# Patient Record
Sex: FEMALE | Race: WHITE | NOT HISPANIC OR LATINO | Employment: UNEMPLOYED | URBAN - METROPOLITAN AREA
[De-identification: names, ages, dates, MRNs, and addresses within clinical notes are randomized per-mention and may not be internally consistent; named-entity substitution may affect disease eponyms.]

---

## 2020-12-07 ENCOUNTER — OFFICE VISIT (OUTPATIENT)
Dept: ORTHOPEDIC SURGERY | Facility: CLINIC | Age: 40
End: 2020-12-07

## 2020-12-07 VITALS — BODY MASS INDEX: 50.02 KG/M2 | HEIGHT: 64 IN | WEIGHT: 293 LBS | HEART RATE: 83 BPM | OXYGEN SATURATION: 99 %

## 2020-12-07 DIAGNOSIS — M17.11 OSTEOARTHRITIS OF RIGHT KNEE, UNSPECIFIED OSTEOARTHRITIS TYPE: Primary | ICD-10-CM

## 2020-12-07 PROCEDURE — 99203 OFFICE O/P NEW LOW 30 MIN: CPT | Performed by: ORTHOPAEDIC SURGERY

## 2020-12-07 PROCEDURE — 20610 DRAIN/INJ JOINT/BURSA W/O US: CPT | Performed by: ORTHOPAEDIC SURGERY

## 2020-12-07 RX ORDER — GABAPENTIN 300 MG/1
2 CAPSULE ORAL 4 TIMES DAILY
COMMUNITY

## 2020-12-07 RX ORDER — NYSTATIN 10B UNIT
POWDER (EA) MISCELLANEOUS
COMMUNITY
End: 2021-03-08

## 2020-12-07 RX ORDER — ARIPIPRAZOLE 5 MG/1
TABLET ORAL
COMMUNITY

## 2020-12-07 RX ORDER — FAMOTIDINE 20 MG/1
20 TABLET, FILM COATED ORAL 2 TIMES DAILY
COMMUNITY

## 2020-12-07 RX ORDER — MONTELUKAST SODIUM 10 MG/1
TABLET ORAL
COMMUNITY

## 2020-12-07 RX ORDER — BUPROPION HYDROCHLORIDE 200 MG/1
TABLET, EXTENDED RELEASE ORAL EVERY 12 HOURS SCHEDULED
COMMUNITY

## 2020-12-07 RX ORDER — TRIAMCINOLONE ACETONIDE 40 MG/ML
40 INJECTION, SUSPENSION INTRA-ARTICULAR; INTRAMUSCULAR
Status: COMPLETED | OUTPATIENT
Start: 2020-12-07 | End: 2020-12-07

## 2020-12-07 RX ORDER — BACLOFEN 10 MG/1
TABLET ORAL EVERY 8 HOURS SCHEDULED
COMMUNITY

## 2020-12-07 RX ORDER — DEXTROAMPHETAMINE SACCHARATE, AMPHETAMINE ASPARTATE MONOHYDRATE, DEXTROAMPHETAMINE SULFATE AND AMPHETAMINE SULFATE 6.25; 6.25; 6.25; 6.25 MG/1; MG/1; MG/1; MG/1
CAPSULE, EXTENDED RELEASE ORAL
COMMUNITY

## 2020-12-07 RX ORDER — ALBUTEROL SULFATE 90 UG/1
AEROSOL, METERED RESPIRATORY (INHALATION)
COMMUNITY

## 2020-12-07 RX ORDER — ROPIVACAINE HYDROCHLORIDE 5 MG/ML
4 INJECTION, SOLUTION EPIDURAL; INFILTRATION; PERINEURAL
Status: COMPLETED | OUTPATIENT
Start: 2020-12-07 | End: 2020-12-07

## 2020-12-07 RX ORDER — NORETHINDRONE ACETATE AND ETHINYL ESTRADIOL AND FERROUS FUMARATE 5-7-9-7
KIT ORAL
COMMUNITY

## 2020-12-07 RX ORDER — DULOXETIN HYDROCHLORIDE 60 MG/1
CAPSULE, DELAYED RELEASE ORAL
COMMUNITY

## 2020-12-07 RX ORDER — ALPRAZOLAM 1 MG/1
TABLET ORAL EVERY 12 HOURS SCHEDULED
COMMUNITY

## 2020-12-07 RX ORDER — SUMATRIPTAN 100 MG/1
100 TABLET, FILM COATED ORAL
COMMUNITY

## 2020-12-07 RX ORDER — PANTOPRAZOLE SODIUM 40 MG/1
TABLET, DELAYED RELEASE ORAL
COMMUNITY

## 2020-12-07 RX ORDER — AMITRIPTYLINE HYDROCHLORIDE 25 MG/1
TABLET, FILM COATED ORAL
COMMUNITY

## 2020-12-07 RX ORDER — HYDROCODONE BITARTRATE AND ACETAMINOPHEN 7.5; 325 MG/1; MG/1
TABLET ORAL
COMMUNITY

## 2020-12-07 RX ORDER — LIDOCAINE 4 G/G
PATCH TOPICAL
COMMUNITY
End: 2021-03-08

## 2020-12-07 RX ADMIN — ROPIVACAINE HYDROCHLORIDE 4 ML: 5 INJECTION, SOLUTION EPIDURAL; INFILTRATION; PERINEURAL at 17:17

## 2020-12-07 RX ADMIN — TRIAMCINOLONE ACETONIDE 40 MG: 40 INJECTION, SUSPENSION INTRA-ARTICULAR; INTRAMUSCULAR at 17:17

## 2020-12-07 NOTE — PROGRESS NOTES
Procedure   Large Joint Arthrocentesis: R knee  Date/Time: 12/7/2020 5:17 PM  Consent given by: patient  Site marked: site marked  Timeout: Immediately prior to procedure a time out was called to verify the correct patient, procedure, equipment, support staff and site/side marked as required   Supporting Documentation  Indications: pain   Procedure Details  Location: knee - R knee  Preparation: Patient was prepped and draped in the usual sterile fashion  Needle size: 22 G  Approach: anterolateral  Medications administered: 4 mL ropivacaine 0.5 %; 40 mg triamcinolone acetonide 40 MG/ML  Patient tolerance: patient tolerated the procedure well with no immediate complications

## 2020-12-07 NOTE — PROGRESS NOTES
[facility]    Subjective     Chief Complaint   Patient presents with   • Right Knee - Pain        HPI    Yue Reyes is a 40 y.o. female who presents with right knee pain.  Onset: direct blow. The issue has been ongoing for 6 month(s). Pain is a 5/10 on the pain scale. Pain is described as aching, throbbing and stabbing. Associated symptoms include pain, swelling, popping, stiffness and giving way/buckling. The pain is worse with walking and standing; resting, ice, heat and pain medication and/or NSAID improve the pain. Previous treatments have included: steroid injection (last injection 08/2020).  Patient has a history of MS, and is wheelchair-bound.    I have reviewed the following portions of the patient's history and agree with: History of Present Illness and Review of Systems    There is no problem list on file for this patient.    Past Medical History:   Diagnosis Date   • Anemia    • Asthma    • Back problem    • Diabetes (CMS/[facility])    • MS (multiple sclerosis) (CMS/[facility])       Past Surgical History:   Procedure Laterality Date   • CHOLECYSTECTOMY     • EYE SURGERY Left     strabismus repair   • GASTROSTOMY      vertical sleeve   • INCISION AND DRAINAGE ABSCESS      abdominal wall      Family History   Problem Relation Age of Onset   • Cancer Mother    • Gout Father    • Diabetes Maternal Grandmother    • Diabetes Paternal Grandmother      Social History     Socioeconomic History   • Marital status:      Spouse name: Not on file   • Number of children: Not on file   • Years of education: Not on file   • Highest education level: Not on file   Tobacco Use   • Smoking status: Never Smoker   • Smokeless tobacco: Never Used   Substance and Sexual Activity   • Alcohol use: Never     Frequency: Never   • Drug use: Never   • Sexual activity: Defer      Current Outpatient Medications on File Prior to Visit   Medication Sig Dispense Refill   • albuterol sulfate HFA (Ventolin HFA)  108 (90 Base) MCG/ACT inhaler Ventolin HFA     • ALPRAZolam (XANAX) 1 MG tablet Every 12 (Twelve) Hours.     • amitriptyline (ELAVIL) 25 MG tablet amitriptyline 25 mg tablet   TK 1 TO 2 TS PO HS     • amphetamine-dextroamphetamine XR (Adderall XR) 25 MG 24 hr capsule Adderall   25mg 2 capsules once daily     • ARIPiprazole (ABILIFY) 5 MG tablet aripiprazole   5mg one tab daily     • baclofen (LIORESAL) 10 MG tablet Every 8 (Eight) Hours.     • buPROPion SR (WELLBUTRIN SR) 200 MG 12 hr tablet Every 12 (Twelve) Hours.     • Cholecalciferol (Vitamin D3) 25 MCG (1000 UT) capsule Take  by mouth.     • Diclofenac Sodium (Voltaren) 1 % gel gel Voltaren 1 % topical gel   APPLY 2 GRAMS TO THE AFFECTED AREA(S) BY TOPICAL ROUTE 4 TIMES PER DAY     • DULoxetine (CYMBALTA) 60 MG capsule duloxetine 60 mg capsule,delayed release   Take 2 capsules every day by oral route at bedtime.     • Ergocalciferol (VITAMIN D2 PO) 1.25 mg.     • famotidine (PEPCID) 20 MG tablet Take 20 mg by mouth 2 (Two) Times a Day.     • fluticasone-salmeterol (Advair Diskus) 500-50 MCG/DOSE DISKUS Inhale 2 (Two) Times a Day.     • gabapentin (NEURONTIN) 300 MG capsule Take 2 capsules by mouth 4 (Four) Times a Day.     • HYDROcodone-acetaminophen (Norco) 7.5-325 MG per tablet Norco 7.5 mg-325 mg tablet   Take 1 tablet as needed by oral route.     • Lidocaine 4 % patch Apply  topically.     • metFORMIN (GLUCOPHAGE) 500 MG tablet Every 12 (Twelve) Hours.     • montelukast (SINGULAIR) 10 MG tablet montelukast 10 mg tablet   Take 1 tablet every day by oral route at bedtime.     • norethindrone-ethinyl estradiol-iron (ESTROSTEP FE) 1-20/1-30/1-35 MG-MCG tablet noreth-ethinyl estradiol-iron   1mg/0.02mg one daily     • Nystatin powder nystatin   powder PRN     • pantoprazole (Protonix) 40 MG EC tablet pantoprazole   40mg daily     • SUMAtriptan (IMITREX) 100 MG tablet 100 mg.     • Triamcinolone Acetonide (NASACORT ALLERGY 24HR NA) Nasacort   daily as needed    "    No current facility-administered medications on file prior to visit.       Allergies   Allergen Reactions   • Adhesive Tape Other (See Comments)     Paper tape caused blisters; other tapes cause redness/irritation   • Sulfa Antibiotics Hives        Review of Systems   Constitutional: Positive for fatigue.   HENT: Positive for congestion, postnasal drip and sneezing.    Eyes: Positive for itching and visual disturbance.   Respiratory: Negative.    Cardiovascular: Positive for leg swelling.   Gastrointestinal: Negative.    Endocrine: Positive for cold intolerance and polydipsia.   Genitourinary: Positive for decreased urine volume, difficulty urinating, dyspareunia, menstrual problem, pelvic pain and urgency.   Musculoskeletal: Positive for arthralgias, back pain, joint swelling, myalgias, neck pain and neck stiffness.   Skin: Negative.    Allergic/Immunologic: Positive for environmental allergies.   Neurological: Positive for tremors, speech difficulty, weakness, numbness and headaches.   Hematological: Bruises/bleeds easily.   Psychiatric/Behavioral: Positive for confusion, decreased concentration and sleep disturbance. The patient is nervous/anxious.         Objective      Physical Exam  Pulse 83   Ht 162.6 cm (64\")   Wt (!) 153 kg (338 lb)   SpO2 99%   BMI 58.02 kg/m²     Body mass index is 58.02 kg/m².    General:   Mental Status:  Alert   Appearance: Cooperative, in no acute distress   Build and Nutrition: Obese female   Orientation: Alert and oriented to person, place and time   Posture: Sitting in a wheelchair    Integument:   Right knee: No skin lesions, no rash, no ecchymosis    Neurologic:   Sensation:    Right foot: Intact to light touch on the dorsal and plantar aspect   Motor:  Right lower extremity: Intact quadriceps, hamstrings, ankle dorsiflexors, and ankle plantar flexors    Vascular:   Right lower extremity: 2+ dorsalis pedis pulse, prompt capillary refill    Lower Extremities:   Right " Knee:    Tenderness:  Medial and lateral joint line tenderness    Effusion:  None    Swelling:  None    Crepitus:  Positive    Atrophy:  None    Range of motion:  Extension: 0°       Flexion: 120°  Instability:  No varus laxity, no valgus laxity, negative anterior drawer  Deformities:  None  Negative Stinchfield with full range of motion of the hip      Imaging/Studies    Outside radiographs from 6/23/2020 of the right knee showed degenerative changes, with no acute bony abnormalities.  These were nonweightbearing x-rays.    X-rays of her right hip were also obtained here today, which show good alignment, with no significant degenerative changes.  No acute bony abnormalities.    Assessment and Plan     Diagnoses and all orders for this visit:    1. Osteoarthritis of right knee, unspecified osteoarthritis type (Primary)  -     Large Joint Arthrocentesis: R knee        1. Osteoarthritis of right knee, unspecified osteoarthritis type        I reviewed my findings with the patient today.  She does have right knee degeneration, and I offered her an intra-articular injection.  She is not a good surgical candidate, nor is it warranted at this point.  The degree of her MS would certainly make any rehabilitative efforts very difficult.  She may be a candidate for viscosupplementation injections in the future.  I would like to try to get weightbearing x-rays on her in the future if possible.    Procedure Note:  The potential benefits of performing a therapeutic right knee joint injection, as well as potential risks (including, but not limited to infection, swelling, pain, bleeding, bruising, nerve/blood vessel damage, skin color changes, transient elevation in blood glucose levels, and fat atrophy) were discussed with the patient.  After informed consent, timeout procedure was performed, and the skin on the right knee was prepped with chlorhexidine soap and alcohol, after which ethyl chloride was applied to the skin at the  injection site. Via the anterolateral approach, 1ml of Kenalog 40mg/ml mixed with 4ml 0.5% ropivacaine plain was injected into the knee joint.  The patient tolerated the procedure well, experiencing 75% improvement a few minutes following the injection. There were no complications.  Band-Aid was applied to the injection site. Post-procedural instructions were given to the patient and/or their caregiver.      Return in about 3 months (around 3/7/2021).    Medical Decision Making  Management Options : prescription/IM medicine  Data/Risk: radiology tests and independent visualization of imaging, lab tests, or EMG/NCV      Tapan Burton MD  12/07/20  18:16 MEDHAT Franco disclaimer:  Much of this encounter note is an electronic transcription/translation of spoken language to printed text. The electronic translation of spoken language may permit erroneous, or at times, nonsensical words or phrases to be inadvertently transcribed; Although I have reviewed the note for such errors, some may still exist.

## 2021-03-08 ENCOUNTER — OFFICE VISIT (OUTPATIENT)
Dept: ORTHOPEDIC SURGERY | Facility: CLINIC | Age: 41
End: 2021-03-08

## 2021-03-08 VITALS
SYSTOLIC BLOOD PRESSURE: 155 MMHG | BODY MASS INDEX: 50.02 KG/M2 | DIASTOLIC BLOOD PRESSURE: 92 MMHG | WEIGHT: 293 LBS | HEIGHT: 64 IN | HEART RATE: 107 BPM

## 2021-03-08 DIAGNOSIS — E66.01 OBESITY, MORBID, BMI 50 OR HIGHER (HCC): ICD-10-CM

## 2021-03-08 DIAGNOSIS — M17.11 OSTEOARTHRITIS OF RIGHT KNEE, UNSPECIFIED OSTEOARTHRITIS TYPE: Primary | ICD-10-CM

## 2021-03-08 PROCEDURE — 20610 DRAIN/INJ JOINT/BURSA W/O US: CPT | Performed by: ORTHOPAEDIC SURGERY

## 2021-03-08 PROCEDURE — 99213 OFFICE O/P EST LOW 20 MIN: CPT | Performed by: ORTHOPAEDIC SURGERY

## 2021-03-08 RX ADMIN — TRIAMCINOLONE ACETONIDE 40 MG: 40 INJECTION, SUSPENSION INTRA-ARTICULAR; INTRAMUSCULAR at 16:52

## 2021-03-08 RX ADMIN — ROPIVACAINE HYDROCHLORIDE 4 ML: 5 INJECTION, SOLUTION EPIDURAL; INFILTRATION; PERINEURAL at 16:52

## 2021-03-08 NOTE — PROGRESS NOTES
Procedure   Large Joint Arthrocentesis: R knee  Date/Time: 3/8/2021 4:52 PM  Consent given by: patient  Site marked: site marked  Timeout: Immediately prior to procedure a time out was called to verify the correct patient, procedure, equipment, support staff and site/side marked as required   Supporting Documentation  Indications: pain   Procedure Details  Location: knee - R knee  Preparation: Patient was prepped and draped in the usual sterile fashion  Needle size: 22 G  Approach: anterolateral  Medications administered: 40 mg triamcinolone acetonide 40 MG/ML; 4 mL ropivacaine 0.5 %  Patient tolerance: patient tolerated the procedure well with no immediate complications

## 2021-03-08 NOTE — PROGRESS NOTES
[facility]    Subjective     Chief Complaint   Patient presents with   • Follow-up     3 months follow up for Osteoarthritis of right knee        HPI    Yue Reyes is a 41 y.o. female who follows up for right knee pain. Last visit was 12/07/2020. We did an injection at her last visit. She is not a good surgical candidate. Her body mass index is 58.5. She states that she has had relief up until just recently, still feels better than when she was here the first time. She states she has not had any other injections in her right knee since our last visit. She reports that she has lost 34 pounds since 02/2020. She has a lot of spasm in the lower extremities due to multiple sclerosis.     I have reviewed the following portions of the patient's history and agree with: History of Present Illness and Review of Systems    There is no problem list on file for this patient.    Past Medical History:   Diagnosis Date   • Anemia    • Asthma    • Back problem    • Diabetes (CMS/[facility])    • MS (multiple sclerosis) (CMS/[facility])       Past Surgical History:   Procedure Laterality Date   • CHOLECYSTECTOMY     • EYE SURGERY Left     strabismus repair   • GASTROSTOMY      vertical sleeve   • INCISION AND DRAINAGE ABSCESS      abdominal wall      Family History   Problem Relation Age of Onset   • Cancer Mother    • Gout Father    • Diabetes Maternal Grandmother    • Diabetes Paternal Grandmother      Social History     Socioeconomic History   • Marital status:      Spouse name: Not on file   • Number of children: Not on file   • Years of education: Not on file   • Highest education level: Not on file   Tobacco Use   • Smoking status: Never Smoker   • Smokeless tobacco: Never Used   Substance and Sexual Activity   • Alcohol use: Never   • Drug use: Never   • Sexual activity: Defer      Current Outpatient Medications on File Prior to Visit   Medication Sig Dispense Refill   • albuterol sulfate HFA  (Ventolin HFA) 108 (90 Base) MCG/ACT inhaler Ventolin HFA     • ALPRAZolam (XANAX) 1 MG tablet Every 12 (Twelve) Hours.     • amitriptyline (ELAVIL) 25 MG tablet amitriptyline 25 mg tablet   TK 1 TO 2 TS PO HS     • amphetamine-dextroamphetamine XR (Adderall XR) 25 MG 24 hr capsule Adderall   25mg 2 capsules once daily     • ARIPiprazole (ABILIFY) 5 MG tablet aripiprazole   5mg one tab daily     • baclofen (LIORESAL) 10 MG tablet Every 8 (Eight) Hours.     • buPROPion SR (WELLBUTRIN SR) 200 MG 12 hr tablet Every 12 (Twelve) Hours.     • Cholecalciferol (Vitamin D3) 25 MCG (1000 UT) capsule Take  by mouth.     • Diclofenac Sodium (Voltaren) 1 % gel gel Voltaren 1 % topical gel   APPLY 2 GRAMS TO THE AFFECTED AREA(S) BY TOPICAL ROUTE 4 TIMES PER DAY     • DULoxetine (CYMBALTA) 60 MG capsule duloxetine 60 mg capsule,delayed release   Take 2 capsules every day by oral route at bedtime.     • Ergocalciferol (VITAMIN D2 PO) 1.25 mg.     • famotidine (PEPCID) 20 MG tablet Take 20 mg by mouth 2 (Two) Times a Day.     • fluticasone-salmeterol (Advair Diskus) 500-50 MCG/DOSE DISKUS Inhale 2 (Two) Times a Day.     • gabapentin (NEURONTIN) 300 MG capsule Take 2 capsules by mouth 4 (Four) Times a Day.     • HYDROcodone-acetaminophen (Norco) 7.5-325 MG per tablet Norco 7.5 mg-325 mg tablet   Take 1 tablet as needed by oral route.     • metFORMIN (GLUCOPHAGE) 500 MG tablet Every 12 (Twelve) Hours.     • montelukast (SINGULAIR) 10 MG tablet montelukast 10 mg tablet   Take 1 tablet every day by oral route at bedtime.     • norethindrone-ethinyl estradiol-iron (ESTROSTEP FE) 1-20/1-30/1-35 MG-MCG tablet noreth-ethinyl estradiol-iron   1mg/0.02mg one daily     • pantoprazole (Protonix) 40 MG EC tablet pantoprazole   40mg daily     • SUMAtriptan (IMITREX) 100 MG tablet 100 mg.     • Triamcinolone Acetonide (NASACORT ALLERGY 24HR NA) Nasacort   daily as needed       No current facility-administered medications on file prior to visit.       Allergies   Allergen Reactions   • Adhesive Tape Other (See Comments)     Paper tape caused blisters; other tapes cause redness/irritation   • Sulfa Antibiotics Hives        Review of Systems   Constitutional: Negative.  Negative for activity change, appetite change, chills, diaphoresis, fatigue, fever and unexpected weight change.   HENT: Negative.  Negative for congestion, dental problem, drooling, ear discharge, ear pain, facial swelling, hearing loss, mouth sores, nosebleeds, postnasal drip, rhinorrhea, sinus pressure, sneezing, sore throat, tinnitus, trouble swallowing and voice change.    Eyes: Negative.  Negative for photophobia, pain, discharge, redness, itching and visual disturbance.   Respiratory: Negative.  Negative for apnea, cough, choking, chest tightness, shortness of breath, wheezing and stridor.    Cardiovascular: Negative.  Negative for chest pain, palpitations and leg swelling.   Gastrointestinal: Negative.  Negative for abdominal distention, abdominal pain, anal bleeding, blood in stool, constipation, diarrhea, nausea, rectal pain and vomiting.   Endocrine: Negative.  Negative for cold intolerance, heat intolerance, polydipsia, polyphagia and polyuria.   Genitourinary: Negative.  Negative for decreased urine volume, difficulty urinating, dysuria, enuresis, flank pain, frequency, genital sores, hematuria and urgency.   Musculoskeletal: Positive for arthralgias. Negative for back pain, gait problem, joint swelling, myalgias, neck pain and neck stiffness.   Skin: Negative.  Negative for color change, pallor, rash and wound.   Allergic/Immunologic: Negative.  Negative for environmental allergies, food allergies and immunocompromised state.   Neurological: Negative.  Negative for dizziness, tremors, seizures, syncope, facial asymmetry, speech difficulty, weakness, light-headedness, numbness and headaches.   Hematological: Negative.  Negative for adenopathy. Does not bruise/bleed easily.  "  Psychiatric/Behavioral: Negative.  Negative for agitation, behavioral problems, confusion, decreased concentration, dysphoric mood, hallucinations, self-injury, sleep disturbance and suicidal ideas. The patient is not nervous/anxious and is not hyperactive.         Objective      Physical Exam  /92   Pulse 107   Ht 162.6 cm (64.02\")   Wt (!) 155 kg (341 lb)   BMI 58.50 kg/m²     Body mass index is 58.5 kg/m².    General:   Mental Status:  Alert   Appearance: Cooperative, in no acute distress   Build and Nutrition: Obese female   Orientation: Alert and oriented to person, place and time   Posture: Normal   Gait: Sitting in wheelchair    Integument       • Right knee: No skin lesions, rash, or ecchymosis.    Lower Extremities  • Right Knee:        • Tenderness: Medial and lateral joint line tenderness       • Swelling: None        • Effusion: None       • Crepitus: None       • Atrophy: None       • Range of motion:             • Extension: 0°             • Flexion: 125°        • Instability: No varus or valgus laxity. Negative anterior drawer.       • Deformities: None    Imaging/Studies  Imaging Results (Last 24 Hours)     ** No results found for the last 24 hours. **            Assessment and Plan     Diagnoses and all orders for this visit:    1. Osteoarthritis of right knee, unspecified osteoarthritis type (Primary)  -     XR Knee 4+ View Right  -     Large Joint Arthrocentesis: R knee  -     triamcinolone acetonide (KENALOG-40) injection 40 mg  -     ropivacaine (NAROPIN) 0.5 % injection 4 mL    2. Obesity, morbid, BMI 50 or higher (CMS/HCC)        1. Osteoarthritis of right knee, unspecified osteoarthritis type    2. Obesity, morbid, BMI 50 or higher (CMS/HCC)        Ms. Reyes presents today for right knee follow-up. We did an injection at her last visit. She is not a good surgical candidate. Her body mass index is 58.5. She states that she has had relief up until just recently, and still feels " better than when she was here the first time. We discussed options today of either a repeat injection, or to wait for a repeat injection. She wishes to receive a right knee injection today.    Procedure Note:   The potential benefits of performing a therapeutic right knee joint injection, as well as potential risks (including, but not limited to infection, swelling, pain, bleeding, bruising, nerve/blood vessel damage, skin color changes, transient elevation in blood glucose levels, and fat atrophy) were discussed with the patient.  After informed consent was obtained, a timeout procedure was performed, and the skin on the right knee was prepped with chlorhexidine soap and alcohol, after which ethyl chloride was applied to the skin at the injection site. Via the anterolateral approach, 1 ml of Kenalog 40 mg/ml mixed with 4 ml 0.5% ropivacaine plain was injected into the knee joint.  The patient tolerated the procedure well. There were no complications. A Band-Aid was applied to the injection site. Post-procedural instructions were given to the patient and/or their caregiver.    Return in about 4 months (around 7/8/2021).      Scribed for Tapan Burton MD by Julia Gonzalez.  03/08/21   20:31 EST    I have personally performed the services described in this document as scribed by the above individual, and it is both accurate and complete.  Tapan Burton MD  3/19/2021  14:32 EST

## 2021-03-09 RX ORDER — ROPIVACAINE HYDROCHLORIDE 5 MG/ML
4 INJECTION, SOLUTION EPIDURAL; INFILTRATION; PERINEURAL
Status: COMPLETED | OUTPATIENT
Start: 2021-03-08 | End: 2021-03-08

## 2021-03-09 RX ORDER — TRIAMCINOLONE ACETONIDE 40 MG/ML
40 INJECTION, SUSPENSION INTRA-ARTICULAR; INTRAMUSCULAR
Status: COMPLETED | OUTPATIENT
Start: 2021-03-08 | End: 2021-03-08

## 2025-03-14 ENCOUNTER — OFFICE VISIT (OUTPATIENT)
Age: 45
End: 2025-03-14
Payer: MEDICARE

## 2025-03-14 VITALS
DIASTOLIC BLOOD PRESSURE: 84 MMHG | SYSTOLIC BLOOD PRESSURE: 130 MMHG | BODY MASS INDEX: 44.85 KG/M2 | HEIGHT: 64 IN | WEIGHT: 262.7 LBS

## 2025-03-14 DIAGNOSIS — M25.552 LEFT HIP PAIN: Primary | ICD-10-CM

## 2025-03-14 PROCEDURE — 99204 OFFICE O/P NEW MOD 45 MIN: CPT | Performed by: ORTHOPAEDIC SURGERY

## 2025-03-14 PROCEDURE — 1159F MED LIST DOCD IN RCRD: CPT | Performed by: ORTHOPAEDIC SURGERY

## 2025-03-14 PROCEDURE — 1160F RVW MEDS BY RX/DR IN RCRD: CPT | Performed by: ORTHOPAEDIC SURGERY

## 2025-03-14 RX ORDER — METHOCARBAMOL 500 MG/1
500 TABLET, FILM COATED ORAL
COMMUNITY
Start: 2025-03-06 | End: 2025-03-16

## 2025-03-14 RX ORDER — BUPROPION HYDROCHLORIDE 75 MG/1
75 TABLET ORAL
COMMUNITY

## 2025-03-14 RX ORDER — RIZATRIPTAN BENZOATE 10 MG/1
TABLET, ORALLY DISINTEGRATING ORAL
COMMUNITY

## 2025-03-14 RX ORDER — AMITRIPTYLINE HYDROCHLORIDE 10 MG/1
10 TABLET ORAL DAILY
COMMUNITY

## 2025-03-14 RX ORDER — SEMAGLUTIDE 2.68 MG/ML
INJECTION, SOLUTION SUBCUTANEOUS
COMMUNITY

## 2025-03-14 RX ORDER — DEXTROAMPHETAMINE SACCHARATE, AMPHETAMINE ASPARTATE, DEXTROAMPHETAMINE SULFATE AND AMPHETAMINE SULFATE 7.5; 7.5; 7.5; 7.5 MG/1; MG/1; MG/1; MG/1
1 TABLET ORAL 2 TIMES DAILY
COMMUNITY

## 2025-03-14 RX ORDER — HYDROCODONE BITARTRATE AND ACETAMINOPHEN 10; 325 MG/1; MG/1
1 TABLET ORAL
COMMUNITY
Start: 2025-02-18

## 2025-03-14 RX ORDER — ARIPIPRAZOLE 2 MG/1
2 TABLET ORAL DAILY
COMMUNITY

## 2025-03-14 RX ORDER — BACLOFEN 20 MG/1
20 TABLET ORAL 3 TIMES DAILY
COMMUNITY

## 2025-03-14 NOTE — PROGRESS NOTES
[facility] Note    Subjective     Chief Complaint   Patient presents with    Left Hip - Pain        HPI    Yue Reyes is a 45 y.o. female who presents with new problem of: left hip pain.  Onset: atraumatic and gradual in nature. The issue has been ongoing for 10 year(s). Pain is a 7/10 on the pain scale. Pain is described as dull, aching, and shooting. Associated symptoms include pain, grinding, stiffness, and giving way/buckling. The pain is worse with walking, standing, sitting, climbing stairs, sleeping, lying on affected side, and rising from seated position; heat, pain medication and/or NSAID, and tens unit  improve the pain. Previous treatments have included: nothing.  Pain is on the lateral side of the hip.  History of bursectomy with Dr. Mohr about a year ago.  No improvement afterwards.  No improvement with physical therapy.  History of fibromyalgia.  She also has a history of MS, and was in a wheelchair for 5 years.  Also has a history of SI joint dysfunction.    I have reviewed the following portions of the patient's history and agree with: History of Present Illness and Review of Systems    There is no problem list on file for this patient.    Past Medical History:   Diagnosis Date    Anemia     Asthma     Back problem     Diabetes     MS (multiple sclerosis)       Past Surgical History:   Procedure Laterality Date    BURSECTOMY Bilateral     CHOLECYSTECTOMY      EYE SURGERY Left     strabismus repair    GASTROSTOMY      vertical sleeve    HYSTERECTOMY      INCISION AND DRAINAGE ABSCESS      abdominal wall      Family History   Problem Relation Age of Onset    Cancer Mother     Gout Father     Diabetes Maternal Grandmother     Diabetes Paternal Grandmother      Social History     Socioeconomic History    Marital status:    Tobacco Use    Smoking status: Never    Smokeless tobacco: Never   Vaping Use    Vaping status: Never Used   Substance and Sexual Activity     Alcohol use: Never    Drug use: Never    Sexual activity: Defer      Current Outpatient Medications on File Prior to Visit   Medication Sig Dispense Refill    albuterol sulfate HFA (Ventolin HFA) 108 (90 Base) MCG/ACT inhaler Ventolin HFA      amitriptyline (ELAVIL) 10 MG tablet Take 1 tablet by mouth Daily.      amphetamine-dextroamphetamine (ADDERALL) 30 MG tablet Take 1 tablet by mouth 2 (Two) Times a Day.      ARIPiprazole (ABILIFY) 2 MG tablet Take 1 tablet by mouth Daily.      buPROPion (WELLBUTRIN) 75 MG tablet Take 1 tablet by mouth.      Cholecalciferol (Vitamin D3) 25 MCG (1000 UT) capsule Take  by mouth.      Diclofenac Sodium (Voltaren) 1 % gel gel Voltaren 1 % topical gel   APPLY 2 GRAMS TO THE AFFECTED AREA(S) BY TOPICAL ROUTE 4 TIMES PER DAY      DULoxetine (CYMBALTA) 60 MG capsule duloxetine 60 mg capsule,delayed release   Take 2 capsules every day by oral route at bedtime.      Ergocalciferol (VITAMIN D2 PO) 1.25 mg.      fluticasone-salmeterol (Advair Diskus) 500-50 MCG/DOSE DISKUS Inhale 2 (Two) Times a Day.      gabapentin (NEURONTIN) 300 MG capsule Take 2 capsules by mouth 4 (Four) Times a Day.      HYDROcodone-acetaminophen (NORCO)  MG per tablet Take 1 tablet by mouth.      methocarbamol (ROBAXIN) 500 MG tablet Take 1 tablet by mouth.      montelukast (SINGULAIR) 10 MG tablet montelukast 10 mg tablet   Take 1 tablet every day by oral route at bedtime.      Ozempic, 2 MG/DOSE, 8 MG/3ML solution pen-injector INJECT 2 MG SUBCUTANEOUSLY ONCE WEEKLY.      rizatriptan MLT (MAXALT-MLT) 10 MG disintegrating tablet 1 tablet by oral route.      VITAMIN E PO Take  by mouth.      baclofen (LIORESAL) 20 MG tablet Take 1 tablet by mouth 3 (Three) Times a Day. (Patient not taking: Reported on 3/14/2025)      [DISCONTINUED] ALPRAZolam (XANAX) 1 MG tablet Every 12 (Twelve) Hours.      [DISCONTINUED] amitriptyline (ELAVIL) 25 MG tablet amitriptyline 25 mg tablet   TK 1 TO 2 TS PO HS      [DISCONTINUED]  amphetamine-dextroamphetamine XR (Adderall XR) 25 MG 24 hr capsule Adderall   25mg 2 capsules once daily      [DISCONTINUED] ARIPiprazole (ABILIFY) 5 MG tablet aripiprazole   5mg one tab daily      [DISCONTINUED] baclofen (LIORESAL) 10 MG tablet Every 8 (Eight) Hours.      [DISCONTINUED] buPROPion SR (WELLBUTRIN SR) 200 MG 12 hr tablet Every 12 (Twelve) Hours.      [DISCONTINUED] famotidine (PEPCID) 20 MG tablet Take 20 mg by mouth 2 (Two) Times a Day.      [DISCONTINUED] HYDROcodone-acetaminophen (Norco) 7.5-325 MG per tablet Norco 7.5 mg-325 mg tablet   Take 1 tablet as needed by oral route.      [DISCONTINUED] metFORMIN (GLUCOPHAGE) 500 MG tablet Every 12 (Twelve) Hours.      [DISCONTINUED] norethindrone-ethinyl estradiol-iron (ESTROSTEP FE) 1-20/1-30/1-35 MG-MCG tablet noreth-ethinyl estradiol-iron   1mg/0.02mg one daily      [DISCONTINUED] pantoprazole (Protonix) 40 MG EC tablet pantoprazole   40mg daily      [DISCONTINUED] SUMAtriptan (IMITREX) 100 MG tablet 100 mg.      [DISCONTINUED] Triamcinolone Acetonide (NASACORT ALLERGY 24HR NA) Nasacort   daily as needed       No current facility-administered medications on file prior to visit.      Allergies   Allergen Reactions    Adhesive Tape Other (See Comments)     Paper tape caused blisters; other tapes cause redness/irritation    Ciprofloxacin Unknown - High Severity    Sulfa Antibiotics Hives        Review of Systems   Constitutional:  Negative for activity change, appetite change, chills, diaphoresis, fatigue, fever and unexpected weight change.   HENT:  Negative for congestion, dental problem, drooling, ear discharge, ear pain, facial swelling, hearing loss, mouth sores, nosebleeds, postnasal drip, rhinorrhea, sinus pressure, sneezing, sore throat, tinnitus, trouble swallowing and voice change.    Eyes:  Negative for photophobia, pain, discharge, redness, itching and visual disturbance.   Respiratory:  Negative for apnea, cough, choking, chest tightness,  "shortness of breath, wheezing and stridor.    Cardiovascular:  Negative for chest pain, palpitations and leg swelling.   Gastrointestinal:  Negative for abdominal distention, abdominal pain, anal bleeding, blood in stool, constipation, diarrhea, nausea, rectal pain and vomiting.   Endocrine: Negative for cold intolerance, heat intolerance, polydipsia, polyphagia and polyuria.   Genitourinary:  Negative for decreased urine volume, difficulty urinating, dysuria, enuresis, flank pain, frequency, genital sores, hematuria and urgency.   Musculoskeletal:  Positive for arthralgias. Negative for back pain, gait problem, joint swelling, myalgias, neck pain and neck stiffness.   Skin:  Negative for color change, pallor, rash and wound.   Allergic/Immunologic: Negative for environmental allergies, food allergies and immunocompromised state.   Neurological:  Negative for dizziness, tremors, seizures, syncope, facial asymmetry, speech difficulty, weakness, light-headedness, numbness and headaches.   Hematological:  Negative for adenopathy. Does not bruise/bleed easily.   Psychiatric/Behavioral:  Negative for agitation, behavioral problems, confusion, decreased concentration, dysphoric mood, hallucinations, self-injury, sleep disturbance and suicidal ideas. The patient is not nervous/anxious and is not hyperactive.         Objective      Physical Exam  /84   Ht 161.3 cm (63.5\")   Wt 119 kg (262 lb 11.2 oz)   BMI 45.81 kg/m²     Body mass index is 45.81 kg/m².  BMI cannot be calculated due to outdated height or weight values.  Please input a current height/weight in Vitals and re-renter BMIFOLLOWUP in Note to pull in correct documentation based on BMI range.        General:   Mental Status:  Alert   Appearance: Cooperative, in no acute distress   Build and Nutrition: Obese by BMI female   Orientation: Alert and oriented to person, place and time   Posture: Normal   Gait: Nonantalgic    Integument:   Left hip: Previous " lateral incision healed    Neurologic:   Motor:  Left lower extremity: Intact quadriceps, hamstrings, ankle dorsiflexors, and ankle plantar flexors    Lower Extremity:   Left Hip:    Tenderness:  Lateral tenderness    Swelling:  None    Crepitus:  None    Atrophy:  None    Range of motion:  External Rotation: 30°       Internal Rotation: 30°       Flexion:  100°       Extension:  0°   Instability:  None  Deformities:  None  Functional testing: Negative Stinchfield    No leg length discrepancy      Imaging/Studies      Imaging Results (Last 24 Hours)       Procedure Component Value Units Date/Time    XR Hip With or Without Pelvis 2 - 3 View Left [[id number]] Resulted: 03/14/25 1110     Updated: 03/14/25 1110    Narrative:      Left Hip Radiographs  Indication: left hip pain  Views: low AP pelvis and lateral of the left hip    Comparison: no prior studies available for review    Findings:   No acute bony abnormalities, good alignment, no unusual bony features.              Assessment and Plan     Diagnoses and all orders for this visit:    1. Left hip pain (Primary)  -     XR Hip With or Without Pelvis 2 - 3 View Left        1. Left hip pain          I reviewed my findings with the patient.  She has chronic lateral hip pain, with a previous bursectomy about a year ago with Dr. Mohr.  No improvement afterwards.  I do not believe any further surgical intervention would be particular helpful.  Patient does have a history of fibromyalgia, and would likely benefit from treatment for that condition with a specialist.    Return if symptoms worsen or fail to improve.      Tapan Burton MD  03/14/25  13:47 EDT      Dictated Utilizing Dragon Dictation